# Patient Record
Sex: FEMALE | Race: WHITE | NOT HISPANIC OR LATINO | Employment: PART TIME | URBAN - METROPOLITAN AREA
[De-identification: names, ages, dates, MRNs, and addresses within clinical notes are randomized per-mention and may not be internally consistent; named-entity substitution may affect disease eponyms.]

---

## 2017-01-23 ENCOUNTER — OFFICE VISIT (OUTPATIENT)
Dept: OBSTETRICS AND GYNECOLOGY | Facility: CLINIC | Age: 27
End: 2017-01-23

## 2017-01-23 VITALS
HEIGHT: 64 IN | WEIGHT: 122 LBS | DIASTOLIC BLOOD PRESSURE: 66 MMHG | HEART RATE: 67 BPM | BODY MASS INDEX: 20.83 KG/M2 | SYSTOLIC BLOOD PRESSURE: 103 MMHG

## 2017-01-23 DIAGNOSIS — Z01.419 ENCOUNTER FOR GYNECOLOGICAL EXAMINATION WITHOUT ABNORMAL FINDING: ICD-10-CM

## 2017-01-23 DIAGNOSIS — Z01.419 PAP SMEAR, AS PART OF ROUTINE GYNECOLOGICAL EXAMINATION: Primary | ICD-10-CM

## 2017-01-23 PROCEDURE — 99395 PREV VISIT EST AGE 18-39: CPT | Performed by: OBSTETRICS & GYNECOLOGY

## 2017-01-23 RX ORDER — NORGESTIMATE AND ETHINYL ESTRADIOL 7DAYSX3 28
1 KIT ORAL DAILY
Qty: 28 TABLET | Refills: 12 | Status: SHIPPED | OUTPATIENT
Start: 2017-01-23 | End: 2017-12-28 | Stop reason: SDUPTHER

## 2017-01-23 NOTE — PROGRESS NOTES
"Shay Singhsey Norton is a 26 y.o. female annual exam.    History of Present Illness    Patient is a 26 y.o. for annual exam. Pt has been off bc pills for 1 month- would like to re-start.    Health Maintenance   Topic Date Due   • TDAP/TD VACCINES (1 - Tdap) 11/26/2009   • INFLUENZA VACCINE  08/01/2016   • PAP SMEAR  12/02/2016       The following portions of the patient's history were reviewed and updated as appropriate: allergies, current medications, past family history, past medical history, past social history, past surgical history and problem list.    Review of Systems   Genitourinary:        See HPI   All other systems reviewed and are negative.      Vitals:    01/23/17 1230   BP: 103/66   Pulse: 67   Weight: 122 lb (55.3 kg)   Height: 64\" (162.6 cm)       Objective   Physical Exam   Constitutional: She is oriented to person, place, and time. She appears well-developed and well-nourished.   HENT:   Head: Normocephalic and atraumatic.   Eyes: Conjunctivae and EOM are normal.   Neck: Normal range of motion. Neck supple. No thyromegaly present.   Cardiovascular: Normal rate, regular rhythm and normal heart sounds.    Pulmonary/Chest: Effort normal and breath sounds normal. Right breast exhibits no mass, no nipple discharge and no tenderness. Left breast exhibits no mass, no nipple discharge and no tenderness.   Abdominal: Soft. Bowel sounds are normal. There is no hepatosplenomegaly. There is no tenderness. No hernia.   Genitourinary: Rectum normal, vagina normal and uterus normal. Rectal exam shows no external hemorrhoid. There is no rash, tenderness or lesion on the right labia. There is no rash, tenderness or lesion on the left labia. Uterus is not enlarged and not tender. Cervix exhibits no discharge. Right adnexum displays no mass and no tenderness. Left adnexum displays no mass and no tenderness. No tenderness in the vagina. No vaginal discharge found.   Musculoskeletal: Normal range of motion. She " exhibits no edema.   Lymphadenopathy:        Right: No inguinal adenopathy present.        Left: No inguinal adenopathy present.   Neurological: She is alert and oriented to person, place, and time.   Skin: Skin is warm and dry. No rash noted.   Psychiatric: She has a normal mood and affect.   Vitals reviewed.        Assessment/Plan   Jany was seen today for annual exam.    Diagnoses and all orders for this visit:    Pap smear, as part of routine gynecological examination  -     Pap IG, Rfx HPV ASCU    Encounter for gynecological examination without abnormal finding    Other orders  -     norgestimate-ethinyl estradiol (ORTHO TRI-CYCLEN,TRINESSA) 0.18/0.215/0.25 MG-35 MCG per tablet; Take 1 tablet by mouth Daily.    Correct pill use reviewed.              Return in about 1 year (around 1/23/2018).

## 2017-01-23 NOTE — MR AVS SNAPSHOT
Jany Norton   2017 12:30 PM   Office Visit    Dept Phone:  [phone]   Encounter #:  [id number]    Provider:  Lavonne Hill MD   Department:  [facility]                Your Full Care Plan              Where to Get Your Medications      These medications were sent to [facility] [place], [place] [address] [facility] - [phone] [facility] [phone] [address] [address]     Phone:  [phone]     norgestimate-ethinyl estradiol 0.18/0.215/0.25 MG-35 MCG per tablet            Your Updated Medication List          This list is accurate as of: 17 12:44 PM.  Always use your most recent med list.                norgestimate-ethinyl estradiol 0.18/0.215/0.25 MG-35 MCG per tablet   Commonly known as:  ORTHO TRI-CYCLEN,TRINESSA   Take 1 tablet by mouth Daily.               We Performed the Following     Pap IG, Rfx HPV ASCU       You Were Diagnosed With        Codes Comments    Pap smear, as part of routine gynecological examination    -  Primary ICD-10-CM: Z01.419  ICD-9-CM: V76.2     Encounter for gynecological examination without abnormal finding     ICD-10-CM: Z01.419  ICD-9-CM: V72.31       Instructions     None    Patient Instructions History      Upcoming Appointments     Visit Type Date Time Department    [facility] 2017 12:30 PM [facility] LOB[facility]N PRES      [employer] Signup     [facility] [employer] allows you to send messages to your doctor, view your test results, renew your prescriptions, schedule appointments, and more. To sign up, go to [employer] and click on the Sign Up Now link in the New User? box. Enter your [employer] Activation Code exactly as it appears below along with the last four digits of your Social Security Number and your Date of Birth () to complete the sign-up process. If you do not sign up before the expiration date, you must request a new code.    [employer]  "Activation Code: [id number]  Expires: 2/6/2017 12:44 PM    If you have questions, you can email [email or web address] or call [phone] to talk to our [email or web address] staff. Remember, [employer] is NOT to be used for urgent needs. For medical emergencies, dial 911.               Other Info from Your Visit           Allergies     No Known Allergies      Reason for Visit     Annual Exam           Vital Signs     Blood Pressure Pulse Height Weight Last Menstrual Period Breastfeeding?    103/66 67 64\" (162.6 cm) 122 lb (55.3 kg) 12/23/2016 (Exact Date) No    Body Mass Index Smoking Status                20.94 kg/m2 Former Smoker          Problems and Diagnoses Noted     Encounter for routine gynecological examination    Screening for cervical cancer    -  Primary        "

## 2017-01-25 LAB
CONV .: NORMAL
CYTOLOGIST CVX/VAG CYTO: NORMAL
CYTOLOGY CVX/VAG DOC THIN PREP: NORMAL
DX ICD CODE: NORMAL
HIV 1 & 2 AB SER-IMP: NORMAL
OTHER STN SPEC: NORMAL
PATH REPORT.FINAL DX SPEC: NORMAL
STAT OF ADQ CVX/VAG CYTO-IMP: NORMAL

## 2017-04-19 ENCOUNTER — TELEPHONE (OUTPATIENT)
Dept: OBSTETRICS AND GYNECOLOGY | Facility: CLINIC | Age: 27
End: 2017-04-19

## 2017-04-19 NOTE — TELEPHONE ENCOUNTER
----- Message from Gracia Emanuel sent at 4/18/2017  4:01 PM EDT -----  MS LECHUGA MISSED AN WHOLE WEEK OF BC PILLS  DUE TO LOSING THEM, BUT STATED SHE HAS FOUND THEM. PT WANTING TO KNOW IF SHE COULD START NEW PACK WHILE SHE STILL CYCLE NOW. PER LAW ADVISE PT TO WAIT TIL NEXT CYCLE TO START NEW PACK DUE TO MISSING THE WHOLE WEEK, AND USE CONDOMS AS A BACK UP IN THE MEAN TIME.     Velma- If she is on her cycle now, she can start a new pack. She does need to use condoms for a month.

## 2017-12-28 ENCOUNTER — TELEPHONE (OUTPATIENT)
Dept: OBSTETRICS AND GYNECOLOGY | Facility: CLINIC | Age: 27
End: 2017-12-28

## 2017-12-28 RX ORDER — NORGESTIMATE AND ETHINYL ESTRADIOL 7DAYSX3 28
1 KIT ORAL DAILY
Qty: 28 TABLET | Refills: 1 | Status: SHIPPED | OUTPATIENT
Start: 2017-12-28 | End: 2018-02-14 | Stop reason: SDUPTHER

## 2018-02-14 ENCOUNTER — OFFICE VISIT (OUTPATIENT)
Dept: OBSTETRICS AND GYNECOLOGY | Facility: CLINIC | Age: 28
End: 2018-02-14

## 2018-02-14 VITALS
HEART RATE: 72 BPM | DIASTOLIC BLOOD PRESSURE: 63 MMHG | BODY MASS INDEX: 21.51 KG/M2 | HEIGHT: 64 IN | WEIGHT: 126 LBS | SYSTOLIC BLOOD PRESSURE: 104 MMHG

## 2018-02-14 DIAGNOSIS — Z01.419 WELL WOMAN EXAM WITH ROUTINE GYNECOLOGICAL EXAM: Primary | ICD-10-CM

## 2018-02-14 PROCEDURE — 99395 PREV VISIT EST AGE 18-39: CPT | Performed by: OBSTETRICS & GYNECOLOGY

## 2018-02-14 RX ORDER — NORGESTIMATE AND ETHINYL ESTRADIOL 7DAYSX3 28
1 KIT ORAL DAILY
Qty: 28 TABLET | Refills: 12 | Status: SHIPPED | OUTPATIENT
Start: 2018-02-14 | End: 2019-02-14

## 2018-02-14 NOTE — PROGRESS NOTES
Subjective   Jany Norton is a 27 y.o. female  Patient is here for annual. Last pap 17    History of Present Illness   Jany Norton is a 27 y.o.  who presents for an annual examination.  She reports that she has a spot she thinks might be a genital wart that she wants removed    Had biopsy in 2016 that she thinks was genital wart    Pap history:  Last pap: 2017 NIL  Prior abnormal paps: no  STDs  Sexually active: yes, 1 male partner  History of STDs: yes, genital warts  Has had HPV vaccine: yes  Contraception:  OCP, satisfied with OCP and desires to continued    History of physical abuse: no, History of sexual abuse: no and History of verbal/emotional abuse: no    Screening for BRCA-   Is patient's family history significant for any of the following?   no  For non-Ashkenazi Taoist women:   Two first-degree relatives with breast cancer, one of whom was diagnosed at age 50 or younger   A combination of three or more first or second-degree relatives with breast cancer regardless of age at diagnosis   A combination of both breast and ovarian cancer among first and second-degree relatives   A first-degree relative with bilateral breast cancer   A combination of two or more first or second degree relatives with ovarian cancer, regardless of age at diagnosis   A first or second-degree relative with both breast and ovarian cancer at any age   History of breast cancer in a male relative   For women of Ashkenazi Taoist descent:   Any first-degree relative (or two second degree relatives on the same side of the family) with breast or ovarian cancer   Recommendations from the [place] Preventive Services Task Force on who should be offered genetic testing for BRCA mutations*     History reviewed. No pertinent past medical history.  Past Surgical History:   Procedure Laterality Date   • WISDOM TOOTH EXTRACTION       Social History   Substance Use Topics   • Smoking status: Current Every Day Smoker      "Packs/day: 0.50   • Smokeless tobacco: Never Used   • Alcohol use Yes      Comment: social     Family History   Problem Relation Age of Onset   • Breast cancer Neg Hx    • Ovarian cancer Neg Hx    • Uterine cancer Neg Hx    • Colon cancer Neg Hx      Current Outpatient Prescriptions on File Prior to Visit   Medication Sig Dispense Refill   • norgestimate-ethinyl estradiol (ORTHO TRI-CYCLEN,TRINESSA) 0.18/0.215/0.25 MG-35 MCG per tablet Take 1 tablet by mouth Daily. 28 tablet 1     No current facility-administered medications on file prior to visit.      No Known Allergies       Review of Systems   Constitutional: Negative for chills, fever and unexpected weight change.   HENT: Negative for congestion, nosebleeds and sore throat.    Eyes: Negative for visual disturbance.   Respiratory: Negative for cough, chest tightness and shortness of breath.    Cardiovascular: Negative for chest pain and palpitations.   Gastrointestinal: Negative for abdominal pain, constipation, diarrhea, nausea and vomiting.   Endocrine: Negative for polyuria.   Genitourinary: Negative for difficulty urinating, dyspareunia, dysuria, frequency, genital sores, hematuria, menstrual problem, pelvic pain, urgency, vaginal bleeding, vaginal discharge and vaginal pain.   Musculoskeletal: Negative for arthralgias and joint swelling.   Skin: Negative for color change and rash.        Skin tag on mons   Neurological: Negative for dizziness, light-headedness and headaches.   Hematological: Does not bruise/bleed easily.   Psychiatric/Behavioral: Negative for dysphoric mood. The patient is not nervous/anxious.        Objective    /63  Pulse 72  Ht 162.6 cm (64.02\")  Wt 57.2 kg (126 lb)  LMP 01/24/2018 (Within Days)  BMI 21.62 kg/m2   Physical Exam   Constitutional: She is oriented to person, place, and time. She appears well-developed and well-nourished. No distress.   HENT:   Head: Normocephalic and atraumatic.   Neck: No tracheal deviation " present. No thyromegaly present.   Cardiovascular: Normal rate, regular rhythm and normal heart sounds.  Exam reveals no gallop and no friction rub.    No murmur heard.  Pulmonary/Chest: Effort normal and breath sounds normal. No respiratory distress. She has no wheezes. She has no rales. She exhibits no tenderness. Right breast exhibits no inverted nipple, no mass, no nipple discharge, no skin change and no tenderness. Left breast exhibits no inverted nipple, no mass, no nipple discharge, no skin change and no tenderness.   Abdominal: Soft. She exhibits no distension and no mass. There is no tenderness.   Genitourinary: Uterus normal. No labial fusion. There is no rash, lesion or injury on the right labia. There is no rash, lesion or injury on the left labia. Uterus is not deviated, not enlarged, not fixed and not tender. Cervix exhibits no motion tenderness, no discharge and no friability. Right adnexum displays no mass, no tenderness and no fullness. Left adnexum displays no mass, no tenderness and no fullness. No tenderness or bleeding in the vagina. No vaginal discharge found.   Genitourinary Comments: Two small hyperpigmented lesion - one on mons and one on left labia majora, both <0.5 cm   One <0.25cm lesion on right inguinal fold c/w skin tag   Musculoskeletal: Normal range of motion. She exhibits no edema or deformity.   Lymphadenopathy:     She has no cervical adenopathy.   Neurological: She is alert and oriented to person, place, and time.   Skin: Skin is warm and dry. No rash noted. She is not diaphoretic.   Psychiatric: She has a normal mood and affect. Her behavior is normal. Judgment and thought content normal.         Assessment/Plan   Jany was seen today for gynecologic exam.    Diagnoses and all orders for this visit:    Well woman exam with routine gynecological exam    Well woman counseling/screening:    Cervical cancer screening:    Reports no h/o cervical dysplasia   The patient is due for a  pap in 2020.    Screening guidelines discussed with patient  Breast cancer screening:    Clinical breast exam recommended for age 20-39 years every 1-3 years   Mammogram recommended starting age 40,    Breast self awareness encouraged  STD Screening   Agrees to Gc/ct/trichomonas, declines serum labs  Contraception :   Desires to continue OCP, counseled on other options  Family history    does not demonstrate need for genetics referral   Healthy lifestyle counseling:   Patient was counseled on    Body mass index is 21.62 kg/(m^2).     Skin lesions:   One appears consistent with nevus and the other with skin tag. She has questionable history of genital warts (pathology scanned to chart does not say diagnosis). Patient declines excision,  Okay for observation and return with any changes or with symptoms.

## 2018-02-14 NOTE — PATIENT INSTRUCTIONS
TIMING OF HEALTH BENEFITS AFTER QUITTING SMOKING ([facility] 2004)   Time since quitting Benefits   20 minutes   Your heart rate drops.   12 hours   Carbon monoxide level in your blood drops to normal.   2 weeks to 3 months Your heart attack risk begins to drop.      Your lung function begins to improve.   1 to 9 months   Your coughing and shortness of breath decrease.   1 year    Your added risk of coronary heart disease is half that of a     smoker’s.   5 to 15 years   Your stroke risk is reduced to that of a nonsmoker’s.   10 years   Your lung cancer death rate is about half that of a smoker’s. Your    risk of cancers of the mouth, throat, esophagus, bladder, kidney,    and pancreas decreases.   15 years   Your risk of coronary heart disease is back to that of a     nonsmoker’s.     Adapted from ACOG (www.acog.org)

## 2018-02-16 ENCOUNTER — TELEPHONE (OUTPATIENT)
Dept: OBSTETRICS AND GYNECOLOGY | Facility: CLINIC | Age: 28
End: 2018-02-16

## 2018-02-16 LAB
C TRACH RRNA SPEC QL NAA+PROBE: NEGATIVE
N GONORRHOEA RRNA SPEC QL NAA+PROBE: NEGATIVE
T VAGINALIS RRNA SPEC QL NAA+PROBE: NEGATIVE

## 2018-02-16 NOTE — TELEPHONE ENCOUNTER
----- Message from Aileen Cat MD sent at 2/16/2018 10:11 AM EST -----  Please inform patient of negative gonorrhea/chlamydia/trichomonas testing

## 2018-02-19 ENCOUNTER — TELEPHONE (OUTPATIENT)
Dept: OBSTETRICS AND GYNECOLOGY | Facility: CLINIC | Age: 28
End: 2018-02-19

## 2018-08-01 ENCOUNTER — OFFICE VISIT (OUTPATIENT)
Dept: OBSTETRICS AND GYNECOLOGY | Facility: CLINIC | Age: 28
End: 2018-08-01

## 2018-08-01 VITALS
BODY MASS INDEX: 21.51 KG/M2 | SYSTOLIC BLOOD PRESSURE: 106 MMHG | WEIGHT: 126 LBS | DIASTOLIC BLOOD PRESSURE: 72 MMHG | HEIGHT: 64 IN | HEART RATE: 76 BPM

## 2018-08-01 DIAGNOSIS — N90.89 VULVAR LESION: Primary | ICD-10-CM

## 2018-08-01 PROCEDURE — 99406 BEHAV CHNG SMOKING 3-10 MIN: CPT | Performed by: OBSTETRICS & GYNECOLOGY

## 2018-08-01 PROCEDURE — 99213 OFFICE O/P EST LOW 20 MIN: CPT | Performed by: OBSTETRICS & GYNECOLOGY

## 2018-08-01 NOTE — PROGRESS NOTES
"Shay Carmichael Norton is a 27 y.o. female   Chief Complaint   Patient presents with   • Hemorrhoids     patient reports \"like boil blister\" on her buttom.       History of Present Illness  Patient noticed 2 weeks ago while shaving a bump in the rectal area.  She reports it was bigger but is gotten smaller of the last 2 weeks.  She denies any discharge from the area, pain, itching.  She denies vaginal discharge.  She denies fever chills.  She has not had a lesion like this in the past.  She denies constipation or diarrhea.    History reviewed. No pertinent past medical history.  Past Surgical History:   Procedure Laterality Date   • WISDOM TOOTH EXTRACTION       Social History   Substance Use Topics   • Smoking status: Current Every Day Smoker     Packs/day: 0.50   • Smokeless tobacco: Never Used   • Alcohol use Yes      Comment: social     Family History   Problem Relation Age of Onset   • Breast cancer Neg Hx    • Ovarian cancer Neg Hx    • Uterine cancer Neg Hx    • Colon cancer Neg Hx          Review of Systems   Constitutional: Negative for activity change, appetite change, fatigue, fever and unexpected weight change.   Gastrointestinal: Negative for abdominal pain, nausea and vomiting.   Genitourinary: Negative for menstrual problem, vaginal bleeding, vaginal discharge and vaginal pain.   Skin: Positive for wound (bump in near her rectum).   Hematological: Does not bruise/bleed easily.   Psychiatric/Behavioral: Negative for agitation.       Objective    /72   Pulse 76   Ht 162.6 cm (64.02\")   Wt 57.2 kg (126 lb)   LMP 07/24/2018 (Within Days)   BMI 21.62 kg/m²    Physical Exam   Constitutional: She is oriented to person, place, and time. She appears well-developed and well-nourished. No distress.   HENT:   Head: Normocephalic and atraumatic.   Eyes: EOM are normal. No scleral icterus.   Pulmonary/Chest: Effort normal and breath sounds normal.   Abdominal: There is no tenderness.   Genitourinary: " There is no rash, tenderness, lesion or injury on the right labia. There is lesion on the left labia. There is no rash, tenderness or injury on the left labia.   Genitourinary Comments: Two small hyperpigmented lesion - one on mons and one on left labia majora, both <0.5 cm   One <0.25cm lesion on right inguinal fold c/w skin tag     Small, <1cm hemorrhoid noted, no evidence of thrombosis   Neurological: She is alert and oriented to person, place, and time.   Skin: Skin is warm and dry. She is not diaphoretic.   Psychiatric: She has a normal mood and affect. Her behavior is normal. Judgment and thought content normal.         Assessment/Plan   Problems Addressed this Visit     None      Visit Diagnoses     Vulvar lesion    -  Primary        Hemorrhoid noted.  Patient is asymptomatic and reports that it has significantly decreased in size  We reviewed OTC treatments and avoiding constipation.      Smoking cessation  I advised patient to quit, and offered support.  Patient plans on quitting with her boyfriend.  She is going to follow up with her PCP  I spent 5 minutes counseling the patient on benefits of smoking cessation and risks of continuing the behavior.

## 2019-05-30 ENCOUNTER — TELEPHONE (OUTPATIENT)
Dept: OBSTETRICS AND GYNECOLOGY | Facility: CLINIC | Age: 29
End: 2019-05-30

## 2020-09-04 ENCOUNTER — OFFICE VISIT (OUTPATIENT)
Dept: OBSTETRICS AND GYNECOLOGY | Facility: CLINIC | Age: 30
End: 2020-09-04

## 2020-09-04 VITALS
SYSTOLIC BLOOD PRESSURE: 103 MMHG | WEIGHT: 133 LBS | HEART RATE: 62 BPM | BODY MASS INDEX: 23.57 KG/M2 | HEIGHT: 63 IN | DIASTOLIC BLOOD PRESSURE: 62 MMHG

## 2020-09-04 DIAGNOSIS — Z01.419 WELL WOMAN EXAM WITH ROUTINE GYNECOLOGICAL EXAM: Primary | ICD-10-CM

## 2020-09-04 PROCEDURE — 99395 PREV VISIT EST AGE 18-39: CPT | Performed by: OBSTETRICS & GYNECOLOGY

## 2020-09-04 NOTE — PROGRESS NOTES
Subjective   Chief Complaint   Patient presents with   • Annual Exam     pap:  NIL       History of Present Illness   Jany Norton is a 29 y.o.  who presents for an annual examination. Reports a lesion that is sore that arose on Monday     Pap history:  Last pap: 2017 NIL  Prior abnormal paps: no  STDs  Sexually active: yes, 1 male partner  History of STDs: yes, genital warts  Has had HPV vaccine: yes  Contraception:  Partner had vasectomy    History of physical abuse: no, History of sexual abuse: no and History of verbal/emotional abuse: no    Screening for BRCA-   Is patient's family history significant for any of the following?   no  For non-Ashkenazi Oriental orthodox women:   Two first-degree relatives with breast cancer, one of whom was diagnosed at age 50 or younger   A combination of three or more first or second-degree relatives with breast cancer regardless of age at diagnosis   A combination of both breast and ovarian cancer among first and second-degree relatives   A first-degree relative with bilateral breast cancer   A combination of two or more first or second degree relatives with ovarian cancer, regardless of age at diagnosis   A first or second-degree relative with both breast and ovarian cancer at any age   History of breast cancer in a male relative   For women of Ashkenazi Oriental orthodox descent:   Any first-degree relative (or two second degree relatives on the same side of the family) with breast or ovarian cancer   Recommendations from the [place] Preventive Services Task Force on who should be offered genetic testing for BRCA mutations*     Past Medical History:   Diagnosis Date   • Herpes      Past Surgical History:   Procedure Laterality Date   • WISDOM TOOTH EXTRACTION       Social History     Tobacco Use   • Smoking status: Current Every Day Smoker     Packs/day: 0.25   • Smokeless tobacco: Never Used   Substance Use Topics   • Alcohol use: Yes     Comment: social   • Drug use: No  "    Family History   Problem Relation Age of Onset   • Breast cancer Neg Hx    • Ovarian cancer Neg Hx    • Uterine cancer Neg Hx    • Colon cancer Neg Hx    • Deep vein thrombosis Neg Hx    • Pulmonary embolism Neg Hx      No current outpatient medications on file prior to visit.     No current facility-administered medications on file prior to visit.      No Known Allergies       Review of Systems   Constitutional: Negative for chills, fever and unexpected weight change.   HENT: Negative for congestion, nosebleeds and sore throat.    Eyes: Negative for visual disturbance.   Respiratory: Negative for cough, chest tightness and shortness of breath.    Cardiovascular: Negative for chest pain and palpitations.   Gastrointestinal: Negative for abdominal pain, constipation, diarrhea, nausea and vomiting.   Endocrine: Negative for polyuria.   Genitourinary: Negative for difficulty urinating, dyspareunia, dysuria, frequency, genital sores, hematuria, menstrual problem, pelvic pain, urgency, vaginal bleeding, vaginal discharge and vaginal pain.   Musculoskeletal: Negative for arthralgias and joint swelling.   Skin: Negative for color change and rash.        Sore area on left labia   Neurological: Negative for dizziness, light-headedness and headaches.   Hematological: Does not bruise/bleed easily.   Psychiatric/Behavioral: Negative for dysphoric mood. The patient is not nervous/anxious.        Objective    /62   Pulse 62   Ht 160 cm (63\")   Wt 60.3 kg (133 lb)   LMP 08/25/2020 (Exact Date)   Breastfeeding No   BMI 23.56 kg/m²    Physical Exam   Constitutional: She is oriented to person, place, and time. She appears well-developed and well-nourished. No distress.   HENT:   Head: Normocephalic and atraumatic.   Neck: No tracheal deviation present. No thyromegaly present.   Cardiovascular: Normal rate, regular rhythm and normal heart sounds. Exam reveals no gallop and no friction rub.   No murmur " heard.  Pulmonary/Chest: Effort normal and breath sounds normal. No respiratory distress. She has no wheezes. She has no rales. She exhibits no tenderness. Right breast exhibits no inverted nipple, no mass, no nipple discharge, no skin change and no tenderness. Left breast exhibits no inverted nipple, no mass, no nipple discharge, no skin change and no tenderness.   Abdominal: Soft. She exhibits no distension and no mass. There is no tenderness.   Genitourinary: Uterus normal. No labial fusion. There is no rash, lesion or injury on the right labia. There is no rash, lesion or injury on the left labia. Uterus is not deviated, not enlarged, not fixed and not tender. Cervix exhibits no motion tenderness, no discharge and no friability. Right adnexum displays no mass, no tenderness and no fullness. Left adnexum displays no mass, no tenderness and no fullness. No tenderness or bleeding in the vagina. No vaginal discharge found.   Genitourinary Comments: Two small hyperpigmented lesion - one on mons and one on left labia majora, both <0.5 cm   One <0.25cm lesion on right inguinal fold c/w skin tag    Left labia majora with <0.5cm raised, erythematous lesion that is draining some serous fluid c/w small follicular abscess   Musculoskeletal: Normal range of motion. She exhibits no edema or deformity.   Lymphadenopathy:     She has no cervical adenopathy.   Neurological: She is alert and oriented to person, place, and time.   Skin: Skin is warm and dry. No rash noted. She is not diaphoretic.   Psychiatric: She has a normal mood and affect. Her behavior is normal. Judgment and thought content normal.         Assessment/Plan   Jany was seen today for annual exam.    Diagnoses and all orders for this visit:    Well woman exam with routine gynecological exam      Well woman counseling/screening:    Cervical cancer screening:    Reports no h/o cervical dysplasia   The patient is due for a pap today.    Screening guidelines  discussed with patient  Breast cancer screening:    Clinical breast exam recommended for age 20-39 years every 1-3 years   Mammogram recommended starting age 40,    Breast self awareness encouraged  STD Screening   Agrees to GC/CT/Trichomonas  Contraception :   Declines, partner has vasectomy  Family history    does not demonstrate need for genetics referral   Healthy lifestyle counseling:   Patient was counseled on    Body mass index is 23.56 kg/m².     Skin lesions:   Stable from last year.  Follicular abscess is small and draining. Recommend warm compresses    Smoking cessation  Patient reports current nicotine user   She was counseled on the benefits of smoking cessation including but not limited to cardiovascular benefits.  She is willing to quit.   Quit date: on her 30th birthday  I spent 5 minutes counseling the patient on benefits of smoking cessation and risks of continuing the behavior.

## 2020-09-08 LAB
CONV .: NORMAL
CYTOLOGIST CVX/VAG CYTO: NORMAL
CYTOLOGY CVX/VAG DOC CYTO: NORMAL
CYTOLOGY CVX/VAG DOC THIN PREP: NORMAL
DX ICD CODE: NORMAL
HIV 1 & 2 AB SER-IMP: NORMAL
OTHER STN SPEC: NORMAL
STAT OF ADQ CVX/VAG CYTO-IMP: NORMAL

## 2020-09-09 ENCOUNTER — TELEPHONE (OUTPATIENT)
Dept: OBSTETRICS AND GYNECOLOGY | Facility: CLINIC | Age: 30
End: 2020-09-09

## 2020-09-09 LAB
C TRACH RRNA SPEC QL NAA+PROBE: NEGATIVE
N GONORRHOEA RRNA SPEC QL NAA+PROBE: NEGATIVE
T VAGINALIS DNA SPEC QL NAA+PROBE: POSITIVE

## 2020-09-09 RX ORDER — METRONIDAZOLE 500 MG/1
2000 TABLET ORAL ONCE
Qty: 4 TABLET | Refills: 0 | Status: SHIPPED | OUTPATIENT
Start: 2020-09-09 | End: 2020-09-09

## 2020-09-09 NOTE — TELEPHONE ENCOUNTER
09/09/20  Called patient to inform results, no answer. Left a message to return call.    09/11/20   Pt has been informed of positive trichomonas and to pick Rx to treat and instructions to treat.

## 2020-10-06 ENCOUNTER — OFFICE VISIT (OUTPATIENT)
Dept: OBSTETRICS AND GYNECOLOGY | Facility: CLINIC | Age: 30
End: 2020-10-06

## 2020-10-06 VITALS
SYSTOLIC BLOOD PRESSURE: 99 MMHG | WEIGHT: 139 LBS | HEART RATE: 64 BPM | DIASTOLIC BLOOD PRESSURE: 63 MMHG | HEIGHT: 63 IN | BODY MASS INDEX: 24.63 KG/M2

## 2020-10-06 DIAGNOSIS — Z11.3 SCREENING EXAMINATION FOR STD (SEXUALLY TRANSMITTED DISEASE): Primary | ICD-10-CM

## 2020-10-06 PROCEDURE — 99213 OFFICE O/P EST LOW 20 MIN: CPT | Performed by: OBSTETRICS & GYNECOLOGY

## 2020-10-06 NOTE — PROGRESS NOTES
"SUBJECTIVE:   Chief Complaint   Patient presents with   • test of cure        Jany Norton is a 29 y.o.  who presents for repeat testing for trichomonas. Reports that she and her partner took the medication approximately >2 weeks ago. She is asymptomatic. They have had intercourse since but reports waiting the full 7 days after treatment.     Past Medical History:   Diagnosis Date   • Herpes      Past Surgical History:   Procedure Laterality Date   • WISDOM TOOTH EXTRACTION       OB History    Para Term  AB Living   2 2 2     2   SAB TAB Ectopic Molar Multiple Live Births             2      # Outcome Date GA Lbr Bud/2nd Weight Sex Delivery Anes PTL Lv   2 Term     M Vag-Spont   KACIE   1 Term     F Vag-Spont   KACIE      Social History     Tobacco Use   • Smoking status: Current Every Day Smoker     Packs/day: 0.25   • Smokeless tobacco: Never Used   Substance Use Topics   • Alcohol use: Yes     Comment: social   • Drug use: No     Family History   Problem Relation Age of Onset   • Breast cancer Neg Hx    • Ovarian cancer Neg Hx    • Uterine cancer Neg Hx    • Colon cancer Neg Hx    • Deep vein thrombosis Neg Hx    • Pulmonary embolism Neg Hx      No current outpatient medications on file prior to visit.     No current facility-administered medications on file prior to visit.      No Known Allergies     Review of Systems   Constitutional: Negative.    HENT: Negative.    Respiratory: Negative.    Cardiovascular: Negative.    Gastrointestinal: Negative.    Endocrine: Negative.    Genitourinary: Negative.    Musculoskeletal: Negative.    Skin: Negative.    Neurological: Negative.    Psychiatric/Behavioral: Negative.          OBJECTIVE:   Vitals:    10/06/20 1217   BP: 99/63   Pulse: 64   Weight: 63 kg (139 lb)   Height: 160 cm (62.99\")      Physical Exam  Constitutional:       Appearance: She is well-developed.   HENT:      Head: Normocephalic and atraumatic.   Eyes:      General: No scleral " icterus.  Neck:      Musculoskeletal: Normal range of motion.   Cardiovascular:      Rate and Rhythm: Normal rate.   Pulmonary:      Effort: Pulmonary effort is normal. No respiratory distress.   Abdominal:      Palpations: Abdomen is soft.   Genitourinary:     Vagina: No vaginal discharge.   Skin:     General: Skin is warm and dry.   Neurological:      Mental Status: She is alert and oriented to person, place, and time.   Psychiatric:         Behavior: Behavior normal.         ASSESSMENT/PLAN:     ICD-10-CM ICD-9-CM   1. Screening examination for STD (sexually transmitted disease)  Z11.3 V74.5     Reviewed limitations of testing this close to treatment, possibility of false positive testing as it has been less than 4 weeks. She desires repeat testing today and is aware of the limitations. Agrees to treatment if the test were to come back positive. Decline serum STI labs for HIV/Hepatitis/syphilis.    No follow-ups on file.

## 2020-10-08 LAB
C TRACH RRNA SPEC QL NAA+PROBE: NEGATIVE
N GONORRHOEA RRNA SPEC QL NAA+PROBE: NEGATIVE
T VAGINALIS DNA SPEC QL NAA+PROBE: NEGATIVE

## 2020-10-09 ENCOUNTER — TELEPHONE (OUTPATIENT)
Dept: OBSTETRICS AND GYNECOLOGY | Facility: CLINIC | Age: 30
End: 2020-10-09

## 2020-10-09 NOTE — TELEPHONE ENCOUNTER
----- Message from Aileen Cat MD sent at 10/8/2020  1:08 PM EDT -----  Please inform patient of negative gonorrhea/chlamydia/trichomonas testing    10/09/20  Called patient to inform results, no answer. Left a message to return call.    10/12/20  Called patient to inform results, call was sent straight to voicemail. Left a message to return call.    10/13/20  Called patient to inform results, call was sent straight to voicemail. Left a message to return call.

## 2021-09-28 ENCOUNTER — OFFICE VISIT (OUTPATIENT)
Dept: OBSTETRICS AND GYNECOLOGY | Facility: CLINIC | Age: 31
End: 2021-09-28

## 2021-09-28 VITALS
WEIGHT: 132 LBS | BODY MASS INDEX: 23.39 KG/M2 | DIASTOLIC BLOOD PRESSURE: 67 MMHG | HEIGHT: 63 IN | SYSTOLIC BLOOD PRESSURE: 108 MMHG | HEART RATE: 86 BPM

## 2021-09-28 DIAGNOSIS — Z01.419 WELL WOMAN EXAM WITH ROUTINE GYNECOLOGICAL EXAM: Primary | ICD-10-CM

## 2021-09-28 PROCEDURE — 99395 PREV VISIT EST AGE 18-39: CPT | Performed by: OBSTETRICS & GYNECOLOGY

## 2021-09-28 NOTE — PROGRESS NOTES
Subjective   Chief Complaint   Patient presents with   • Annual Exam     pap:  NIL       History of Present Illness   Jany Norton is a 30 y.o.  who presents for an annual examination. Reports a lesion that is sore that arose on Monday     Pap history:  Last pap: 2020  Prior abnormal paps: no  Gardasil vaccine: unsure  STDs  Sexually active: yes, 1 male partner  History of STDs: yes, genital warts  Has had HPV vaccine: yes  Contraception:  Partner had vasectomy    History of physical abuse: no, History of sexual abuse: no and History of verbal/emotional abuse: no    Screening for BRCA-   Is patient's family history significant for any of the following?   no  For non-Ashkenazi [employer]ab[employer] women:   Two first-degree relatives with breast cancer, one of whom was diagnosed at age 50 or younger   A combination of three or more first or second-degree relatives with breast cancer regardless of age at diagnosis   A combination of both breast and ovarian cancer among first and second-degree relatives   A first-degree relative with bilateral breast cancer   A combination of two or more first or second degree relatives with ovarian cancer, regardless of age at diagnosis   A first or second-degree relative with both breast and ovarian cancer at any age   History of breast cancer in a male relative   For women of Ashkenazi An[employer] descent:   Any first-degree relative (or two second degree relatives on the same side of the family) with breast or ovarian cancer   Recommendations from the [place] Preventive Services Task Force on who should be offered genetic testing for BRCA mutations*     Past Medical History:   Diagnosis Date   • Abnormal Pap smear of cervix    • Herpes      Past Surgical History:   Procedure Laterality Date   • WISDOM TOOTH EXTRACTION       Social History     Tobacco Use   • Smoking status: Current Some Day Smoker     Packs/day: 0.25   • Smokeless tobacco: Never Used   • Tobacco comment: and  "vapes   Substance Use Topics   • Alcohol use: Yes     Comment: social   • Drug use: Yes     Types: Marijuana     Family History   Problem Relation Age of Onset   • Breast cancer Neg Hx    • Ovarian cancer Neg Hx    • Uterine cancer Neg Hx    • Colon cancer Neg Hx    • Deep vein thrombosis Neg Hx    • Pulmonary embolism Neg Hx      No current outpatient medications on file prior to visit.     No current facility-administered medications on file prior to visit.     No Known Allergies       Review of Systems   Constitutional: Negative for chills, fever and unexpected weight change.   HENT: Negative for congestion, nosebleeds and sore throat.    Eyes: Negative for visual disturbance.   Respiratory: Negative for cough, chest tightness and shortness of breath.    Cardiovascular: Negative for chest pain and palpitations.   Gastrointestinal: Negative for abdominal pain, constipation, diarrhea, nausea and vomiting.   Endocrine: Negative for polyuria.   Genitourinary: Negative for difficulty urinating, dyspareunia, dysuria, frequency, genital sores, hematuria, menstrual problem, pelvic pain, urgency, vaginal bleeding, vaginal discharge and vaginal pain.   Musculoskeletal: Negative for arthralgias and joint swelling.   Skin: Negative for color change and rash.   Neurological: Negative for dizziness, light-headedness and headaches.   Hematological: Does not bruise/bleed easily.   Psychiatric/Behavioral: Negative for dysphoric mood. The patient is not nervous/anxious.        Objective    /67   Pulse 86   Ht 160 cm (62.99\")   Wt 59.9 kg (132 lb)   LMP 09/22/2021 (Exact Date)   Breastfeeding No   BMI 23.39 kg/m²    Physical Exam   Constitutional: She is oriented to person, place, and time. She appears well-developed. No distress.   HENT:   Head: Normocephalic and atraumatic.   Neck: No tracheal deviation present. No thyromegaly present.   Cardiovascular: Normal rate, regular rhythm and normal heart sounds. Exam reveals " no gallop and no friction rub.   No murmur heard.  Pulmonary/Chest: Effort normal and breath sounds normal. No respiratory distress. She has no wheezes. She has no rales. She exhibits no tenderness. Right breast exhibits no inverted nipple, no mass, no nipple discharge, no skin change and no tenderness. Left breast exhibits no inverted nipple, no mass, no nipple discharge, no skin change and no tenderness.   Abdominal: Soft. She exhibits no distension and no mass. There is no abdominal tenderness.   Genitourinary: There is no rash, lesion or injury on the right labia. There is no rash, lesion or injury on the left labia. Uterus is not deviated, not enlarged, not fixed and not tender. Cervix exhibits no motion tenderness, no discharge and no friability. Right adnexum displays no mass, no tenderness and no fullness. Left adnexum displays no mass, no tenderness and no fullness.    No vaginal discharge, tenderness or bleeding.   No tenderness or bleeding in the vagina.    Genitourinary Comments: Two small hyperpigmented lesion - one on mons and one on left labia majora, both <0.5 cm   One <0.25cm lesion on right inguinal fold c/w skin tag    Left labia majora with <0.5cm raised, erythematous lesion that is draining some serous fluid c/w small follicular abscess     Musculoskeletal: Normal range of motion. No deformity.   Lymphadenopathy:     She has no cervical adenopathy.   Neurological: She is alert and oriented to person, place, and time.   Skin: Skin is warm and dry. No rash noted. She is not diaphoretic.   Psychiatric: Her behavior is normal. Judgment and thought content normal.         Assessment/Plan   Diagnoses and all orders for this visit:    1. Well woman exam with routine gynecological exam (Primary)      Well woman counseling/screening:    Cervical cancer screening:    Reports no h/o cervical dysplasia   The patient is due for a pap in 2023.    Screening guidelines discussed with patient   Counseled on  Gardasil vaccination  Breast cancer screening:    Clinical breast exam recommended for age 20-39 years every 1-3 years   Mammogram recommended starting age 40,    Breast self awareness encouraged  STD Screening   GC/CT/Trichomonas  Contraception :   Declines, partner has vasectomy  Family history    does not demonstrate need for genetics referral   Healthy lifestyle counseling:   Patient was counseled on    Body mass index is 23.39 kg/m².     Encouraged vaping cessation

## 2022-02-22 ENCOUNTER — OFFICE VISIT (OUTPATIENT)
Dept: FAMILY MEDICINE CLINIC | Facility: CLINIC | Age: 32
End: 2022-02-22

## 2022-02-22 VITALS
HEART RATE: 82 BPM | HEIGHT: 63 IN | TEMPERATURE: 97.7 F | DIASTOLIC BLOOD PRESSURE: 75 MMHG | BODY MASS INDEX: 24.03 KG/M2 | OXYGEN SATURATION: 99 % | WEIGHT: 135.6 LBS | SYSTOLIC BLOOD PRESSURE: 119 MMHG

## 2022-02-22 DIAGNOSIS — R68.89 FORGETFULNESS: ICD-10-CM

## 2022-02-22 DIAGNOSIS — H53.9 VISION CHANGES: ICD-10-CM

## 2022-02-22 DIAGNOSIS — R00.2 PALPITATIONS: ICD-10-CM

## 2022-02-22 DIAGNOSIS — Z00.00 ROUTINE GENERAL MEDICAL EXAMINATION AT A HEALTH CARE FACILITY: ICD-10-CM

## 2022-02-22 DIAGNOSIS — R42 DIZZINESS: Primary | ICD-10-CM

## 2022-02-22 PROCEDURE — 93000 ELECTROCARDIOGRAM COMPLETE: CPT | Performed by: NURSE PRACTITIONER

## 2022-02-22 PROCEDURE — 99204 OFFICE O/P NEW MOD 45 MIN: CPT | Performed by: NURSE PRACTITIONER

## 2022-02-22 NOTE — PROGRESS NOTES
"Chief Complaint  Establish Care (new pt, c/o dizzy spellls and brain fog)    Subjective          [facility] presents to [facility]  History of Present Illness new pt here to est care for dizziness, heart palpitations, episodes of vision change, forgetfulness.      Pt is here with dizzy spells occurring intermittently couple times per month. Over the last yr or so.  Last couple seconds and only occur at work. Improve with sitting for few seconds.  Works at [employer] as -thinks she stays well hydrated and eats prior to work.  No known hypo/hyperglycemia. No weakness.  Never had evaluated.      Recently she had 2 episodes when she woke up with vision changes that felt like an aura with migraine aura which she calls kaleidoscope vision. Lasted about an hour and occurred 2 days in a row. She has never had anything like that in the past. She had no associated headache or weakness. She wears glasses and has regular eye appointments with optometrist. She has no history of migraines and never been diagnosed with ocular migraine.    She has had heart palpitations for over a year. Have been stable. They last a few seconds and occur couple times per month. No associated chest pain or dizziness.    She has also noticed episodes where she becomes forgetful of words or what someone has just said to her. She never gets lost. No history of seizures. She is not sure if it is due to brain fog or just not focusing well.    She is . Spouse had vasectomy for contraception. She has 2 children. She drinks alcohol socially. She smokes 2 to 3 cigarettes/day. No longer vapes. Smokes marijuana on the weekends.    covid beginning of December. Not vaccinated.        Objective   Vital Signs:   /75   Pulse 82   Temp 97.7 °F (36.5 °C)   Ht 160 cm (63\")   Wt 61.5 kg (135 lb 9.6 oz)   SpO2 99%   BMI 24.02 kg/m²     Physical Exam  Vitals and nursing note reviewed.   Constitutional:       " General: She is not in acute distress.     Appearance: She is well-developed. She is not ill-appearing.   HENT:      Head: Normocephalic and atraumatic.      Right Ear: Tympanic membrane, ear canal and external ear normal.      Left Ear: Tympanic membrane, ear canal and external ear normal.      Mouth/Throat:      Mouth: Mucous membranes are moist.      Pharynx: Uvula midline. No posterior oropharyngeal erythema.   Eyes:      General: No scleral icterus.        Right eye: No discharge.         Left eye: No discharge.      Extraocular Movements: Extraocular movements intact.      Conjunctiva/sclera: Conjunctivae normal.      Pupils: Pupils are equal, round, and reactive to light.   Neck:      Thyroid: No thyromegaly.   Cardiovascular:      Rate and Rhythm: Normal rate and regular rhythm. Occasional extrasystoles are present.     Heart sounds: Normal heart sounds. No murmur heard.      Pulmonary:      Effort: Pulmonary effort is normal.      Breath sounds: Normal breath sounds.   Abdominal:      General: Bowel sounds are normal.      Palpations: Abdomen is soft.      Tenderness: There is no abdominal tenderness.   Musculoskeletal:         General: No deformity.      Cervical back: Neck supple.      Comments: Gait smooth and steady   Lymphadenopathy:      Cervical: No cervical adenopathy.   Skin:     General: Skin is warm and dry.   Neurological:      General: No focal deficit present.      Mental Status: She is alert and oriented to person, place, and time.      Comments: Cranial nerves II through XII grossly intact   Psychiatric:         Attention and Perception: Attention and perception normal.         Mood and Affect: Mood and affect normal.         Speech: Speech normal.         Behavior: Behavior normal. Behavior is cooperative.         Thought Content: Thought content normal.         Cognition and Memory: Cognition normal.        Result Review :            ECG 12 Lead    Date/Time: 2/22/2022 2:45 PM  Performed  by: Jerri Palacios APRN  Authorized by: Jerri Palacios APRN   Comparison: not compared with previous ECG   Previous ECG: no previous ECG available  Rhythm: sinus rhythm and sinus arrhythmia  Rate: normal  Conduction: conduction normal  ST Segments: ST segments normal  QRS axis: normal  Other: no other findings    Clinical impression: normal ECG              Assessment and Plan    Diagnoses and all orders for this visit:    1. Dizziness (Primary)  -     Vitamin B12  -     CBC & Differential  -     Comprehensive Metabolic Panel    2. Vision changes    3. Forgetfulness  -     Vitamin B12    4. Routine general medical examination at a health care facility  -     Vitamin B12  -     CBC & Differential  -     Comprehensive Metabolic Panel  -     Lipid Panel With LDL / HDL Ratio  -     TSH Rfx On Abnormal To Free T4  -     Hemoglobin A1c    5. Palpitations  -     ECG 12 Lead    Dizziness: Her neuro exam was grossly normal. EKG was also normal. We will get labs today. Could be ocular migraine. We will have her follow-up early next week to review labs. We may need imaging to further evaluate her neuro symptoms. I do not think any of it is coming from heart palpitations which seem to be chronic and stable. She has been instructed to go the emergency room for repeat symptoms particularly any changes to her vision in the meantime.    Recommend stopping tobacco.            Follow Up   Return in about 1 week (around 3/1/2022), or if symptoms worsen or fail to improve.  Patient was given instructions and counseling regarding her condition or for health maintenance advice. Please see specific information pulled into the AVS if appropriate.

## 2022-02-23 LAB
ALBUMIN SERPL-MCNC: 4.7 G/DL (ref 3.8–4.8)
ALBUMIN/GLOB SERPL: 2 {RATIO} (ref 1.2–2.2)
ALP SERPL-CCNC: 46 IU/L (ref 44–121)
ALT SERPL-CCNC: 10 IU/L (ref 0–32)
AST SERPL-CCNC: 16 IU/L (ref 0–40)
BASOPHILS # BLD AUTO: 0 X10E3/UL (ref 0–0.2)
BASOPHILS NFR BLD AUTO: 1 %
BILIRUB SERPL-MCNC: 0.3 MG/DL (ref 0–1.2)
BUN SERPL-MCNC: 11 MG/DL (ref 6–20)
BUN/CREAT SERPL: 16 (ref 9–23)
CALCIUM SERPL-MCNC: 9.6 MG/DL (ref 8.7–10.2)
CHLORIDE SERPL-SCNC: 102 MMOL/L (ref 96–106)
CHOLEST SERPL-MCNC: 156 MG/DL (ref 100–199)
CO2 SERPL-SCNC: 20 MMOL/L (ref 20–29)
CREAT SERPL-MCNC: 0.67 MG/DL (ref 0.57–1)
EOSINOPHIL # BLD AUTO: 0.1 X10E3/UL (ref 0–0.4)
EOSINOPHIL NFR BLD AUTO: 1 %
ERYTHROCYTE [DISTWIDTH] IN BLOOD BY AUTOMATED COUNT: 12.2 % (ref 11.7–15.4)
GLOBULIN SER CALC-MCNC: 2.4 G/DL (ref 1.5–4.5)
GLUCOSE SERPL-MCNC: 87 MG/DL (ref 65–99)
HBA1C MFR BLD: 4.6 % (ref 4.8–5.6)
HCT VFR BLD AUTO: 43.4 % (ref 34–46.6)
HDLC SERPL-MCNC: 64 MG/DL
HGB BLD-MCNC: 14.9 G/DL (ref 11.1–15.9)
IMM GRANULOCYTES # BLD AUTO: 0 X10E3/UL (ref 0–0.1)
IMM GRANULOCYTES NFR BLD AUTO: 0 %
LDLC SERPL CALC-MCNC: 83 MG/DL (ref 0–99)
LDLC/HDLC SERPL: 1.3 RATIO (ref 0–3.2)
LYMPHOCYTES # BLD AUTO: 2.1 X10E3/UL (ref 0.7–3.1)
LYMPHOCYTES NFR BLD AUTO: 24 %
MCH RBC QN AUTO: 30.7 PG (ref 26.6–33)
MCHC RBC AUTO-ENTMCNC: 34.3 G/DL (ref 31.5–35.7)
MCV RBC AUTO: 89 FL (ref 79–97)
MONOCYTES # BLD AUTO: 0.5 X10E3/UL (ref 0.1–0.9)
MONOCYTES NFR BLD AUTO: 6 %
NEUTROPHILS # BLD AUTO: 5.8 X10E3/UL (ref 1.4–7)
NEUTROPHILS NFR BLD AUTO: 68 %
PLATELET # BLD AUTO: 247 X10E3/UL (ref 150–450)
POTASSIUM SERPL-SCNC: 4.1 MMOL/L (ref 3.5–5.2)
PROT SERPL-MCNC: 7.1 G/DL (ref 6–8.5)
RBC # BLD AUTO: 4.86 X10E6/UL (ref 3.77–5.28)
SODIUM SERPL-SCNC: 139 MMOL/L (ref 134–144)
TRIGL SERPL-MCNC: 41 MG/DL (ref 0–149)
TSH SERPL DL<=0.005 MIU/L-ACNC: 2.81 UIU/ML (ref 0.45–4.5)
VIT B12 SERPL-MCNC: 720 PG/ML (ref 232–1245)
VLDLC SERPL CALC-MCNC: 9 MG/DL (ref 5–40)
WBC # BLD AUTO: 8.6 X10E3/UL (ref 3.4–10.8)

## 2022-02-28 ENCOUNTER — OFFICE VISIT (OUTPATIENT)
Dept: FAMILY MEDICINE CLINIC | Facility: CLINIC | Age: 32
End: 2022-02-28

## 2022-02-28 VITALS
SYSTOLIC BLOOD PRESSURE: 112 MMHG | TEMPERATURE: 97.1 F | DIASTOLIC BLOOD PRESSURE: 75 MMHG | HEART RATE: 78 BPM | OXYGEN SATURATION: 99 %

## 2022-02-28 DIAGNOSIS — R42 DIZZINESS: Primary | ICD-10-CM

## 2022-02-28 DIAGNOSIS — R68.89 FORGETFULNESS: ICD-10-CM

## 2022-02-28 DIAGNOSIS — H53.9 VISION CHANGES: ICD-10-CM

## 2022-02-28 PROCEDURE — 99214 OFFICE O/P EST MOD 30 MIN: CPT | Performed by: NURSE PRACTITIONER

## 2022-02-28 NOTE — PROGRESS NOTES
Chief Complaint  Dizziness (1 wk f/u dizziness)    Subjective          [facility] presents to [facility]  History of Present Illness pt returns today for f/u on her dizziness.  She has not had any noticeable dizziness or episodic forgetfulness, brain fog since last visit.  This is not unusual as she has only had a couple episodes per month.  She has had not more kaleidescope vision changes since last visit.  She does not have migraines and no PMH migraine type HA.  She does note a feeling of fullness nela in the superior aspect cranium when she lays down at night.  No associated nausea/vomiting. No weakness.     Has been drinking Gatorade and trying to eat more frequent small meals per day.  She typically has eaten 2 meals per day.  Does not eat at work and has physically demanding job. Most of her dizziness has been associated with work.  No heart palpitations since last visit.         Objective   Vital Signs:   /75   Pulse 78   Temp 97.1 °F (36.2 °C)   SpO2 99%     Physical Exam  Vitals and nursing note reviewed.   Constitutional:       General: She is not in acute distress.     Appearance: She is well-developed. She is not ill-appearing or diaphoretic.   HENT:      Head: Normocephalic and atraumatic.   Eyes:      General:         Right eye: No discharge.         Left eye: No discharge.      Conjunctiva/sclera: Conjunctivae normal.   Cardiovascular:      Rate and Rhythm: Normal rate and regular rhythm.      Heart sounds: Normal heart sounds.   Pulmonary:      Effort: Pulmonary effort is normal.      Breath sounds: Normal breath sounds.   Abdominal:      General: Bowel sounds are normal.      Palpations: Abdomen is soft.      Tenderness: There is no abdominal tenderness.   Musculoskeletal:         General: No deformity.      Comments: Gait smooth and steady   Skin:     General: Skin is warm and dry.   Neurological:      General: No focal deficit present.      Mental Status:  She is alert and oriented to person, place, and time.      Cranial Nerves: No cranial nerve deficit (CNII-XII grossly intact).      Motor: No weakness.      Coordination: Coordination normal.      Gait: Gait normal.   Psychiatric:         Attention and Perception: Attention and perception normal.         Mood and Affect: Mood and affect normal.         Speech: Speech normal.         Behavior: Behavior normal. Behavior is cooperative.      Comments: Pleasant, conversant, no memory or thought lapses noted during visit        Result Review :                 Assessment and Plan    Diagnoses and all orders for this visit:    1. Dizziness (Primary)  -     MRI Brain With & Without Contrast; Future    2. Vision changes  -     MRI Brain With & Without Contrast; Future    3. Forgetfulness  -     MRI Brain With & Without Contrast; Future      We reviewed recent labs which were all grossly normal other than her A1C was low.  Some of her sx may be due to hypoglycemia which we discussed today.  We discussed dietary mods, small meals, protein/carb balance to avoid hypoglycemia.  This does not explain some of her cognitive changes and vision change which were not related to work.  I think it best to get MRI and pt is agreeable with this plan.  If it is not affordable due to large copay-she will let me know.  Best other option would be to see ophthalmology. We discussed s/s requiring emergent tx.       Follow Up   Return if symptoms worsen or fail to improve.  Patient was given instructions and counseling regarding her condition or for health maintenance advice. Please see specific information pulled into the AVS if appropriate.

## 2022-03-11 ENCOUNTER — TELEPHONE (OUTPATIENT)
Dept: FAMILY MEDICINE CLINIC | Facility: CLINIC | Age: 32
End: 2022-03-11

## 2022-03-11 DIAGNOSIS — H53.9 VISION CHANGES: ICD-10-CM

## 2022-03-11 DIAGNOSIS — R42 DIZZINESS: ICD-10-CM

## 2022-03-11 DIAGNOSIS — R90.89 ABNORMAL FINDING ON MRI OF BRAIN: Primary | ICD-10-CM

## 2022-03-11 DIAGNOSIS — R68.89 FORGETFULNESS: ICD-10-CM

## 2022-03-11 NOTE — TELEPHONE ENCOUNTER
Caller: Jany Norton    Relationship: Self    Best call back number: [phone]    Caller requesting test results: PATIENT     What test was performed: MRI     When was the test performed: 3/8/22

## 2022-10-21 ENCOUNTER — TELEPHONE (OUTPATIENT)
Dept: OBSTETRICS AND GYNECOLOGY | Facility: CLINIC | Age: 32
End: 2022-10-21

## 2022-10-21 NOTE — TELEPHONE ENCOUNTER
Caller: Jany Norton    Relationship to patient: Self    Best call back number: [phone]    Type of visit: ANNUAL    Requested date: RESCHEDULED FOR  1.7.23 @ 2:45 PM    If rescheduling, when is the original appointment: 10.21.22 @ 10:15 AM      Additional notes: PT STARTED HER MENSTRUAL CYCLE LAST NIGHT SO SHE HAD TO RESCHEDULE.

## 2023-02-07 ENCOUNTER — OFFICE VISIT (OUTPATIENT)
Dept: OBSTETRICS AND GYNECOLOGY | Facility: CLINIC | Age: 33
End: 2023-02-07
Payer: COMMERCIAL

## 2023-02-07 VITALS
DIASTOLIC BLOOD PRESSURE: 67 MMHG | HEART RATE: 74 BPM | WEIGHT: 149 LBS | BODY MASS INDEX: 26.4 KG/M2 | SYSTOLIC BLOOD PRESSURE: 119 MMHG | HEIGHT: 63 IN

## 2023-02-07 DIAGNOSIS — Z01.419 WELL WOMAN EXAM WITH ROUTINE GYNECOLOGICAL EXAM: Primary | ICD-10-CM

## 2023-02-07 PROCEDURE — 99395 PREV VISIT EST AGE 18-39: CPT | Performed by: OBSTETRICS & GYNECOLOGY

## 2023-02-07 NOTE — PROGRESS NOTES
Subjective   Chief Complaint   Patient presents with   • Annual Exam     Pt presents today for annual exam. Last annual- 2021, last pap smear- 2020      History of Present Illness   Jany Norton is a 32 y.o.  who presents for an annual examination. Reports the cyst on her right side of the vagina may be slightly larger, no pain or bleeding     Pap history:  Last pap: 2020  Prior abnormal paps: no  Gardasil vaccine: unsure  STDs  Sexually active: yes, 1 male partner  History of STDs: yes, genital warts  Has had HPV vaccine: yes  Contraception:  Partner had vasectomy    History of physical abuse: no, History of sexual abuse: no and History of verbal/emotional abuse: no    Screening for BRCA-   Is patient's family history significant for any of the following?   no  For non-[employer] women:   Two first-degree relatives with breast cancer, one of whom was diagnosed at age 50 or younger   A combination of three or more first or second-degree relatives with breast cancer regardless of age at diagnosis   A combination of both breast and ovarian cancer among first and second-degree relatives   A first-degree relative with bilateral breast cancer   A combination of two or more first or second degree relatives with ovarian cancer, regardless of age at diagnosis   A first or second-degree relative with both breast and ovarian cancer at any age   History of breast cancer in a male relative   For women of [employer] descent:   Any first-degree relative (or two second degree relatives on the same side of the family) with breast or ovarian cancer   Recommendations from the [place] Preventive Services Task Force on who should be offered genetic testing for BRCA mutations*     Past Medical History:   Diagnosis Date   • Abnormal Pap smear of cervix    • Herpes      Past Surgical History:   Procedure Laterality Date   • WISDOM TOOTH EXTRACTION       Social History     Tobacco Use   • Smoking  "status: Some Days     Packs/day: 0.25     Types: Cigarettes   • Smokeless tobacco: Never   • Tobacco comments:     and vapes   Vaping Use   • Vaping Use: Former   Substance Use Topics   • Alcohol use: Yes     Comment: social   • Drug use: Yes     Types: Marijuana     Family History   Problem Relation Age of Onset   • Thyroid disease Mother    • Breast cancer Neg Hx    • Ovarian cancer Neg Hx    • Uterine cancer Neg Hx    • Colon cancer Neg Hx    • Deep vein thrombosis Neg Hx    • Pulmonary embolism Neg Hx      No current outpatient medications on file prior to visit.     No current facility-administered medications on file prior to visit.     No Known Allergies       Review of Systems   Constitutional: Negative for chills, fever and unexpected weight change.   HENT: Negative for congestion, nosebleeds and sore throat.    Eyes: Negative for visual disturbance.   Respiratory: Negative for cough, chest tightness and shortness of breath.    Cardiovascular: Negative for chest pain and palpitations.   Gastrointestinal: Negative for abdominal pain, constipation, diarrhea, nausea and vomiting.   Endocrine: Negative for polyuria.   Genitourinary: Negative for difficulty urinating, dyspareunia, dysuria, frequency, genital sores, hematuria, menstrual problem, pelvic pain, urgency, vaginal bleeding, vaginal discharge and vaginal pain.   Musculoskeletal: Negative for arthralgias and joint swelling.   Skin: Negative for color change and rash.   Neurological: Negative for dizziness, light-headedness and headaches.   Hematological: Does not bruise/bleed easily.   Psychiatric/Behavioral: Negative for dysphoric mood. The patient is not nervous/anxious.        Objective    /67   Pulse 74   Ht 160 cm (62.99\")   Wt 67.6 kg (149 lb)   LMP 01/21/2023   BMI 26.40 kg/m²    Physical Exam   Constitutional: She is oriented to person, place, and time. She appears well-developed. No distress.   HENT:   Head: Normocephalic and " atraumatic.   Neck: No tracheal deviation present. No thyromegaly present.   Cardiovascular: Normal rate, regular rhythm and normal heart sounds. Exam reveals no gallop and no friction rub.   No murmur heard.  Pulmonary/Chest: Effort normal and breath sounds normal. No respiratory distress. She has no wheezes. She has no rales. She exhibits no tenderness. Right breast exhibits no inverted nipple, no mass, no nipple discharge, no skin change and no tenderness. Left breast exhibits no inverted nipple, no mass, no nipple discharge, no skin change and no tenderness.   Abdominal: Soft. She exhibits no distension and no mass. There is no abdominal tenderness.   Genitourinary: There is no rash, lesion or injury on the right labia. There is no rash, lesion or injury on the left labia. Uterus is not deviated, not enlarged, not fixed and not tender. Cervix exhibits no motion tenderness, no discharge and no friability. Right adnexum displays no mass, no tenderness and no fullness. Left adnexum displays no mass, no tenderness and no fullness.    No vaginal discharge, tenderness or bleeding.   No tenderness or bleeding in the vagina.    Genitourinary Comments: At 10 o'clock; there is a clear fluid filled 0.5 cm pedunculated cyst near hymenal ring     Musculoskeletal: Normal range of motion. No deformity.   Lymphadenopathy:     She has no cervical adenopathy.   Neurological: She is alert and oriented to person, place, and time.   Skin: Skin is warm and dry. No rash noted. She is not diaphoretic.   Psychiatric: Her behavior is normal. Judgment and thought content normal.         Assessment & Plan   There are no diagnoses linked to this encounter.  Well woman counseling/screening:    Cervical cancer screening:    Reports no h/o cervical dysplasia   The patient is due for a pap in today.    Screening guidelines discussed with patient   Counseled on Gardasil vaccination  Breast cancer screening:    Clinical breast exam recommended  for age 20-39 years every 1-3 years   Mammogram recommended starting age 40,    Breast self awareness encouraged  STD Screening   GC/CT/Trichomonas  Contraception :   Declines, partner has vasectomy  Family history    does not demonstrate need for genetics referral   Healthy lifestyle counseling:   Patient was counseled on    Body mass index is 26.4 kg/m².     Reviewed cyst- discussed removal but it is not painful or bothersome at this time. Will return if desires removal

## 2023-02-13 LAB
CYTOLOGIST CVX/VAG CYTO: NORMAL
CYTOLOGY CVX/VAG DOC CYTO: NORMAL
CYTOLOGY CVX/VAG DOC THIN PREP: NORMAL
DX ICD CODE: NORMAL
HIV 1 & 2 AB SER-IMP: NORMAL
HPV I/H RISK 4 DNA CVX QL PROBE+SIG AMP: NEGATIVE
OTHER STN SPEC: NORMAL
STAT OF ADQ CVX/VAG CYTO-IMP: NORMAL

## 2024-03-13 ENCOUNTER — OFFICE VISIT (OUTPATIENT)
Dept: OBSTETRICS AND GYNECOLOGY | Facility: CLINIC | Age: 34
End: 2024-03-13
Payer: COMMERCIAL

## 2024-03-13 VITALS
HEIGHT: 63 IN | DIASTOLIC BLOOD PRESSURE: 74 MMHG | WEIGHT: 135 LBS | BODY MASS INDEX: 23.92 KG/M2 | SYSTOLIC BLOOD PRESSURE: 109 MMHG

## 2024-03-13 DIAGNOSIS — N90.89 LABIAL LESION: Primary | ICD-10-CM

## 2024-03-13 NOTE — PROGRESS NOTES
"Chief Complaint   Patient presents with    Follow-up     Pt here today to f/u on cyst, pt says no bleeding or pelvic pain today       SUBJECTIVE:     Jany Norton is a 33 y.o.  who presents to f/u cyst on right side of vagina, cyst has been present for several years, this is not painful, but she feels is growing larger. She is interested in having this removed. This is my first time meeting Jany Norton She is a patient of Dr. Wilders.       Past Medical History:   Diagnosis Date    Abnormal Pap smear of cervix     Herpes       Past Surgical History:   Procedure Laterality Date    WISDOM TOOTH EXTRACTION          Review of Systems   Constitutional:  Negative for chills, fatigue and fever.   Genitourinary:  Negative for dyspareunia, dysuria, menstrual problem, pelvic pain, vaginal bleeding, vaginal discharge and vaginal pain.        + right vaginal lesion/cyst       OBJECTIVE:   Vitals:    24 1421   BP: 109/74   Weight: 61.2 kg (135 lb)   Height: 160 cm (62.99\")        Physical Exam  Constitutional:       General: She is not in acute distress.     Appearance: Normal appearance. She is not ill-appearing, toxic-appearing or diaphoretic.   Genitourinary:      Right Labia: lesions (approx 1cm, soft fluid filled cyst on a stalk right inner labia minora).      Right Labia: No rash, tenderness, skin changes or Bartholin's cyst.     Left Labia: No tenderness, lesions, skin changes, Bartholin's cyst or rash.     No labial fusion noted.      No inguinal adenopathy present in the right or left side.  Cardiovascular:      Rate and Rhythm: Normal rate.   Pulmonary:      Effort: Pulmonary effort is normal.   Abdominal:      Hernia: There is no hernia in the left inguinal area or right inguinal area.   Musculoskeletal:         General: Normal range of motion.      Cervical back: Normal range of motion.   Lymphadenopathy:      Lower Body: No right inguinal adenopathy. No left inguinal adenopathy.   Neurological:      " General: No focal deficit present.      Mental Status: She is alert and oriented to person, place, and time.      Cranial Nerves: No cranial nerve deficit.   Skin:     General: Skin is warm and dry.   Psychiatric:         Mood and Affect: Mood normal.         Behavior: Behavior normal.         Thought Content: Thought content normal.         Judgment: Judgment normal.   Vitals and nursing note reviewed.     Cyst removal Procedure Note    Indications: Approx 1cm, soft fluid filled cyst on a stalk, right inner labia minora She was counseled on exam findings. We reviewed that biopsy of the area can help guide treatment.  Patient was counseled on the risks of a removal including but not limited to bleeding, infection, need for repeat excision, need for additional procedures, failure to diagnose.  She expressed understanding and wished to proceed.   Procedure Details:  A time out was performed  Area was prepped with Betadine.  Local injection of 1 mL of Lidocaine 1% was given at lesion site(s).  Lesion Location: right inner labia minora  The lesion was grasped with forceps and excised with 10 blade   Hemostasis obtained with: silver nitrate    Comments:  Patient tolerated procedure well.  Wound care instructions reviewed.  Call if no results in 7 business days.  Rx NSAID PRN pain     Assessment/Plan    Diagnoses and all orders for this visit:    1. Labial lesion (Primary)  -     Reference Histopathology    Approx 1cm, soft fluid filled cyst on a stalk right inner labia minora   Removed without difficulty, she tolerated the procedure well  Tissue sent to pathology     Return if symptoms worsen or fail to improve.    I spent 25 minutes caring for Jany on this date of service. This time includes time spent by me in the following activities: preparing for the visit, reviewing tests, obtaining and/or reviewing a separately obtained history, performing a medically appropriate examination and/or evaluation, counseling and  educating the patient/family/caregiver, ordering medications, tests, or procedures, referring and communicating with other health care professionals, documenting information in the medical record, and independently interpreting results and communicating that information with the patient/family/caregiver    Madyson Velásquez, APRN  3/13/2024  15:17 EDT

## 2024-03-18 LAB
DX ICD CODE: NORMAL
DX ICD CODE: NORMAL
PATH REPORT.FINAL DX SPEC: NORMAL
PATH REPORT.GROSS SPEC: NORMAL
PATH REPORT.SITE OF ORIGIN SPEC: NORMAL
PATHOLOGIST NAME: NORMAL
PAYMENT PROCEDURE: NORMAL

## 2025-04-30 ENCOUNTER — OFFICE VISIT (OUTPATIENT)
Dept: OBSTETRICS AND GYNECOLOGY | Facility: CLINIC | Age: 35
End: 2025-04-30
Payer: COMMERCIAL

## 2025-04-30 VITALS
DIASTOLIC BLOOD PRESSURE: 67 MMHG | BODY MASS INDEX: 24.45 KG/M2 | SYSTOLIC BLOOD PRESSURE: 103 MMHG | WEIGHT: 138 LBS | HEIGHT: 63 IN

## 2025-04-30 DIAGNOSIS — Z01.419 WOMEN'S ANNUAL ROUTINE GYNECOLOGICAL EXAMINATION: Primary | ICD-10-CM

## 2025-04-30 PROCEDURE — 99395 PREV VISIT EST AGE 18-39: CPT | Performed by: NURSE PRACTITIONER

## 2025-04-30 RX ORDER — UBIDECARENONE 75 MG
50 CAPSULE ORAL DAILY
COMMUNITY

## 2025-04-30 RX ORDER — CHOLECALCIFEROL (VITAMIN D3) 25 MCG
1000 TABLET ORAL DAILY
COMMUNITY

## 2025-04-30 RX ORDER — CHLORAL HYDRATE 500 MG
CAPSULE ORAL
COMMUNITY

## 2025-04-30 NOTE — PROGRESS NOTES
GYN Annual Exam     Chief Complaint   Patient presents with    Gynecologic Exam     Pt here today for AE       HPI    Jany Norton is a 34 y.o. female who presents for annual well woman exam.  She is sexually active. Periods are regular every 28-30 days, lasting 5 days. Dysmenorrhea:moderate, occurring first 1-2 days of flow.  No intermenstrual bleeding, spotting, or discharge. Performing SBE:completes. She is a patient of Dr. Cat's.      OB History          2    Para   2    Term   2            AB        Living   2         SAB        IAB        Ectopic        Molar        Multiple        Live Births   2              LMP- 25  Current contraception: vasectomy  Last Pap-  NIL, HPV negative  History of abnormal Pap smear: no  History of STD-warts  Family history of uterine, colon or ovarian cancer: no  Family history of breast cancer: no  Gardasil Vaccine: unsure    Past Medical History:   Diagnosis Date    Abnormal Pap smear of cervix        Past Surgical History:   Procedure Laterality Date    WISDOM TOOTH EXTRACTION           Current Outpatient Medications:     cholecalciferol (Vitamin D, Cholecalciferol,) 25 MCG (1000 UT) tablet, Take 1 tablet by mouth Daily., Disp: , Rfl:     MAGNESIUM GLUCONATE PO, Take  by mouth., Disp: , Rfl:     Omega-3 Fatty Acids (fish oil) 1000 MG capsule capsule, Take  by mouth., Disp: , Rfl:     PROBIOTIC PRODUCT PO, Take  by mouth., Disp: , Rfl:     TURMERIC PO, Take  by mouth., Disp: , Rfl:     vitamin B-12 (cyanocobalamin) 100 MCG tablet, Take 0.5 tablets by mouth Daily., Disp: , Rfl:     No Known Allergies    Social History     Tobacco Use    Smoking status: Former     Current packs/day: 0.25     Types: Cigarettes    Smokeless tobacco: Never    Tobacco comments:     and vapes   Vaping Use    Vaping status: Former   Substance Use Topics    Alcohol use: Not Currently     Comment: social    Drug use: Not Currently     Types: Marijuana       Family History  "  Problem Relation Age of Onset    Thyroid disease Mother     Lung cancer Maternal Grandmother     Breast cancer Neg Hx     Ovarian cancer Neg Hx     Uterine cancer Neg Hx     Colon cancer Neg Hx     Deep vein thrombosis Neg Hx     Pulmonary embolism Neg Hx        Review of Systems   Constitutional:  Negative for chills, fatigue and fever.   Gastrointestinal:  Negative for abdominal distention, abdominal pain, nausea and vomiting.   Genitourinary:  Negative for breast discharge, breast lump, breast pain, dysuria, frequency, menstrual problem, pelvic pain, pelvic pressure, urgency, vaginal bleeding, vaginal discharge and vaginal pain.   Musculoskeletal:  Negative for gait problem.   Skin:  Negative for rash.   Neurological:  Negative for dizziness and headache.   Psychiatric/Behavioral:  Negative for behavioral problems.        /67   Ht 160 cm (62.99\")   Wt 62.6 kg (138 lb)   LMP 04/14/2025   BMI 24.45 kg/m²     Physical Exam  Constitutional:       General: She is not in acute distress.     Appearance: Normal appearance. She is not ill-appearing, toxic-appearing or diaphoretic.   Genitourinary:      Vulva, bladder and urethral meatus normal.      No lesions in the vagina.      Right Labia: No rash, tenderness, lesions, skin changes or Bartholin's cyst.     Left Labia: No tenderness, lesions, skin changes, Bartholin's cyst or rash.     No labial fusion noted.      No inguinal adenopathy present in the right or left side.     No vaginal discharge, erythema, tenderness, bleeding or ulceration.      No vaginal prolapse present.     No vaginal atrophy present.       Right Adnexa: not tender, not full, not palpable, no mass present and not absent.     Left Adnexa: not tender, not full, not palpable, no mass present and not absent.     No cervical motion tenderness, discharge, friability, lesion, polyp, nabothian cyst or eversion.      Uterus is not enlarged, fixed, tender, irregular or prolapsed.      No uterine " mass detected.     No urethral tenderness or mass present.      Pelvic exam was performed with patient in the lithotomy position.   Breasts:     Breasts are symmetrical.      Right: Present. No swelling, bleeding, inverted nipple, mass, nipple discharge, skin change, tenderness or breast implant.      Left: Present. No swelling, bleeding, inverted nipple, mass, nipple discharge, skin change, tenderness or breast implant.   HENT:      Head: Normocephalic and atraumatic.   Eyes:      Pupils: Pupils are equal, round, and reactive to light.   Cardiovascular:      Rate and Rhythm: Normal rate.   Pulmonary:      Effort: Pulmonary effort is normal.   Abdominal:      General: There is no distension.      Palpations: Abdomen is soft. There is no mass.      Tenderness: There is no abdominal tenderness. There is no guarding.      Hernia: No hernia is present. There is no hernia in the left inguinal area or right inguinal area.   Musculoskeletal:         General: Normal range of motion.      Cervical back: Normal range of motion and neck supple. No tenderness.   Lymphadenopathy:      Cervical: No cervical adenopathy.      Upper Body:      Right upper body: No supraclavicular, axillary or pectoral adenopathy.      Left upper body: No supraclavicular, axillary or pectoral adenopathy.      Lower Body: No right inguinal adenopathy. No left inguinal adenopathy.   Neurological:      General: No focal deficit present.      Mental Status: She is alert and oriented to person, place, and time.      Cranial Nerves: No cranial nerve deficit.   Skin:     General: Skin is warm and dry.   Psychiatric:         Mood and Affect: Mood normal.         Behavior: Behavior normal.         Thought Content: Thought content normal.         Judgment: Judgment normal.   Vitals and nursing note reviewed.     Assessment   Diagnoses and all orders for this visit:    1. Women's annual routine gynecological examination (Primary)       Plan   Well woman exam:  Pap smear up to date. Recommend MVI daily.    Contraception: vasectomy   STD: Enc condoms. Desires STD screen today- No.   Smoking status: former smoker   Encouraged annual mammogram screening starting at age 40. Instructed on how to perform SBE. Encouraged breast health self awareness.  6.    Encouraged 150 minutes of exercise per week if not medially contraindicated.   7.    BMI is within normal parameters. No other follow-up for BMI required.    Return in about 1 year (around 4/30/2026) for Annual physical, With Dr Cat.    Madyson Velásquez, APRN  4/30/2025  09:39 EDT